# Patient Record
Sex: MALE | Race: WHITE | NOT HISPANIC OR LATINO | Employment: PART TIME | URBAN - METROPOLITAN AREA
[De-identification: names, ages, dates, MRNs, and addresses within clinical notes are randomized per-mention and may not be internally consistent; named-entity substitution may affect disease eponyms.]

---

## 2022-03-03 ENCOUNTER — APPOINTMENT (EMERGENCY)
Dept: RADIOLOGY | Facility: HOSPITAL | Age: 22
End: 2022-03-03
Payer: COMMERCIAL

## 2022-03-03 ENCOUNTER — HOSPITAL ENCOUNTER (EMERGENCY)
Facility: HOSPITAL | Age: 22
Discharge: HOME | End: 2022-03-03
Attending: EMERGENCY MEDICINE | Admitting: EMERGENCY MEDICINE
Payer: COMMERCIAL

## 2022-03-03 VITALS
SYSTOLIC BLOOD PRESSURE: 167 MMHG | RESPIRATION RATE: 18 BRPM | OXYGEN SATURATION: 99 % | TEMPERATURE: 98.6 F | DIASTOLIC BLOOD PRESSURE: 99 MMHG | HEART RATE: 67 BPM

## 2022-03-03 DIAGNOSIS — N50.819 TESTICULAR DISCOMFORT: Primary | ICD-10-CM

## 2022-03-03 LAB
BILIRUB UR QL STRIP: NEGATIVE
CLARITY UR: CLEAR
COLOR UR: YELLOW
GLUCOSE UR STRIP-MCNC: NEGATIVE MG/DL
HGB UR QL STRIP.AUTO: NEGATIVE
KETONES UR STRIP-MCNC: NEGATIVE MG/DL
LEUKOCYTE ESTERASE UR QL STRIP: NEGATIVE
NITRITE UR QL STRIP: NEGATIVE
PH UR STRIP.AUTO: 6 [PH] (ref 4.5–8)
PROT UR STRIP-MCNC: NEGATIVE MG/DL
SP GR UR STRIP.AUTO: >=1.03 (ref 1–1.03)
UROBILINOGEN UR QL STRIP.AUTO: 0.2 E.U./DL

## 2022-03-03 PROCEDURE — 87591 N.GONORRHOEAE DNA AMP PROB: CPT

## 2022-03-03 PROCEDURE — 87491 CHLMYD TRACH DNA AMP PROBE: CPT

## 2022-03-03 PROCEDURE — 99284 EMERGENCY DEPT VISIT MOD MDM: CPT

## 2022-03-03 PROCEDURE — 99284 EMERGENCY DEPT VISIT MOD MDM: CPT | Performed by: EMERGENCY MEDICINE

## 2022-03-03 PROCEDURE — 76870 US EXAM SCROTUM: CPT

## 2022-03-03 PROCEDURE — 81003 URINALYSIS AUTO W/O SCOPE: CPT

## 2022-03-03 NOTE — DISCHARGE INSTRUCTIONS
Please follow up with PCP and urologist as directed  Please continue to wear supportive underwear    Your urine was not infected today  GC/Chlamydia was sent out and should return in the next 1-2 days  Until then, please refrain from sexual activity  If resulted positive, needs to be treated and come back to the ED for this  If positive, please inform sexual partner (s)    If you develop worsening discomfort, testicular pain, swelling, discharge, or other testicular issue please return to the ED

## 2022-03-03 NOTE — ED PROVIDER NOTES
History  Chief Complaint   Patient presents with    Testicle Pain     pt states hx epididymitis in childhood which resolved within 1 week; pt states that his R testicle feels uncomfortable and "heavy"; denies pain currently     Patient is a 25year old male with hx of epididymitis, presenting to the ED for evaluation of R testicular swelling and "heaviness" for the past 1 week  Denies testicular pain  He states that heaviness is worse with standing and ambulation, improved with lying down  Patient does wear supportive underwear  He googled his symptoms and is concerned for recurrence of epididymitis or testicular cancer  He does report having a small mass in the R epididymitis from prior episode of inflammation  Patient has had 5 lifetime sexual partners, all women, all vaginal intercourse  Last sexual encounter was in January with a woman he has been dating for the last year  He has no concern for STDs at this time  He denies fevers, chills, abdominal pain, vomiting, dysuria, hematuria, frequency, urgency, penile lesions, penile discharge, pubic rash, pain with ejaculation or abnormal ejaculate color  None       No past medical history on file  No past surgical history on file  No family history on file  I have reviewed and agree with the history as documented  No existing history information found  No existing history information found  Social History     Tobacco Use    Smoking status: Not on file    Smokeless tobacco: Not on file   Substance Use Topics    Alcohol use: Not on file    Drug use: Not on file        Review of Systems   Constitutional: Negative for chills and fever  HENT: Negative for ear pain and sore throat  Eyes: Negative for pain and visual disturbance  Respiratory: Negative for cough, chest tightness and shortness of breath  Cardiovascular: Negative for chest pain and palpitations     Gastrointestinal: Negative for abdominal pain, diarrhea, nausea and vomiting  Genitourinary: Positive for scrotal swelling  Negative for decreased urine volume, difficulty urinating, dysuria, flank pain, frequency, genital sores, hematuria, penile discharge, penile pain, testicular pain and urgency  Musculoskeletal: Negative for arthralgias, back pain, gait problem, joint swelling and myalgias  Skin: Negative for color change and rash  Neurological: Negative for dizziness, seizures, syncope, weakness and headaches  All other systems reviewed and are negative  Physical Exam  ED Triage Vitals [03/03/22 1520]   Temperature Pulse Respirations Blood Pressure SpO2   98 6 °F (37 °C) 67 18 167/99 99 %      Temp Source Heart Rate Source Patient Position - Orthostatic VS BP Location FiO2 (%)   Temporal Monitor Sitting Left arm --      Pain Score       --             Orthostatic Vital Signs  Vitals:    03/03/22 1520   BP: 167/99   Pulse: 67   Patient Position - Orthostatic VS: Sitting       Physical Exam  Vitals and nursing note reviewed  Constitutional:       General: He is not in acute distress  Appearance: Normal appearance  He is well-developed  He is not ill-appearing or toxic-appearing  HENT:      Head: Normocephalic and atraumatic  Right Ear: External ear normal       Left Ear: External ear normal       Nose: Nose normal  No congestion  Mouth/Throat:      Mouth: Mucous membranes are moist       Pharynx: Oropharynx is clear  No posterior oropharyngeal erythema  Eyes:      General: No scleral icterus  Extraocular Movements: Extraocular movements intact  Conjunctiva/sclera: Conjunctivae normal       Pupils: Pupils are equal, round, and reactive to light  Cardiovascular:      Rate and Rhythm: Normal rate and regular rhythm  Pulses: Normal pulses  Heart sounds: Normal heart sounds  No murmur heard  Pulmonary:      Effort: Pulmonary effort is normal  No respiratory distress  Breath sounds: Normal breath sounds   No wheezing, rhonchi or rales  Abdominal:      General: Abdomen is flat  Bowel sounds are normal  There is no distension  Palpations: Abdomen is soft  Tenderness: There is no abdominal tenderness  There is no guarding  Hernia: There is no hernia in the left inguinal area or right inguinal area  Genitourinary:     Pubic Area: No rash  Penis: Normal and circumcised  No erythema, tenderness, discharge, swelling or lesions  Testes: Cremasteric reflex is present  Right: Mass (small palpable mass at the top of testicle at the border of epididymitis), tenderness, swelling, testicular hydrocele or varicocele not present  Cremasteric reflex is present  Left: Mass, tenderness, swelling, testicular hydrocele or varicocele not present  Cremasteric reflex is present  Epididymis:      Right: Not inflamed or enlarged  No mass or tenderness  Left: Normal       Moises stage (genital): 5  Musculoskeletal:         General: No signs of injury  Normal range of motion  Cervical back: Normal range of motion and neck supple  No tenderness  Right lower leg: No edema  Left lower leg: No edema  Lymphadenopathy:      Lower Body: No right inguinal adenopathy  No left inguinal adenopathy  Skin:     General: Skin is warm and dry  Capillary Refill: Capillary refill takes less than 2 seconds  Neurological:      Mental Status: He is alert and oriented to person, place, and time  ED Medications  Medications - No data to display    Diagnostic Studies  Results Reviewed     Procedure Component Value Units Date/Time    Chlamydia/GC amplified DNA by PCR [[id number]] Collected: 03/03/22 1750    Lab Status:  In process Specimen: Urine, Other Updated: 03/03/22 1757    Urine Macroscopic, POC [[id number]] Collected: 03/03/22 1753    Lab Status: Final result Specimen: Urine Updated: 03/03/22 1754     Color, UA Yellow     Clarity, UA Clear     pH, UA 6 0     Leukocytes, UA Negative Nitrite, UA Negative     Protein, UA Negative mg/dl      Glucose, UA Negative mg/dl      Ketones, UA Negative mg/dl      Urobilinogen, UA 0 2 E U /dl      Bilirubin, UA Negative     Blood, UA Negative     Specific Gravity, UA >=1 030    Narrative:      CLINITEK RESULT                 US scrotum and testicles   Final Result by Jonnathan Bonner MD (03/03 1811)       Normal sonographic appearance of the testes  No findings to explain right scrotal heaviness and swelling  Workstation performed: [id number]               Procedures  Procedures      ED Course     Patient requests Ultrasound of testis as he is concerned about testicular cancer  US ordered,as well as UA to check for infection and GC/Chlamydia  US findings reviewed with patient  He remains without testicular pain  Discussed that he should continue to wear supportive briefs and follow up with urology  Referral provided  Encouraged to return if he develops testicular pain or obvious testicular swelling  Advised to refrain from sexual activity until GC/Chlamydia resulted and if it is positive, he needs to be treated  Patient understands the importance of this  Agreeable with plan for discharge  Repeat /75      MDM    Disposition  Final diagnoses:   Testicular discomfort     Time reflects when diagnosis was documented in both MDM as applicable and the Disposition within this note     Time User Action Codes Description Comment    3/3/2022  6:39 PM Derian Medrano Add [N50 [id number]] Testicular discomfort       ED Disposition     ED Disposition Condition Date/Time Comment    Discharge Stable u Mar 3, 2022  6:38 PM Derian Medrano discharge to home/self care              Follow-up Information     Follow up With Specialties Details Why Contact Info [facility] Schedule an appointment as soon as possible for a visit   [address] [facility] [address] [facility] Schedule an appointment as soon as possible for a visit   [address] [facility] [address] [facility] [address]  [phone] [facility] [facility] [address], [place], [place], [address]          Patient's Medications    No medications on file     No discharge procedures on file  PDMP Review     None           ED Provider  Attending physically available and evaluated Neva Mercado I managed the patient along with the ED Attending      Electronically Signed by         Jt Viveros DO  03/03/22 2818

## 2022-03-03 NOTE — ED ATTENDING ATTESTATION
3/3/2022  IRadha MD, saw and evaluated the patient  I have discussed the patient with the resident/non-physician practitioner and agree with the resident's/non-physician practitioner's findings, Plan of Care, and MDM as documented in the resident's/non-physician practitioner's note, except where noted  All available labs and Radiology studies were reviewed  I was present for key portions of any procedure(s) performed by the resident/non-physician practitioner and I was immediately available to provide assistance  At this point I agree with the current assessment done in the Emergency Department  I have conducted an independent evaluation of this patient a history and physical is as follows:    OA: 24 y/o m with R testicular heaviness x 1 week  Pt does have a history of epididymitis as a child but notes the current sxms are different  He denies any overt pain, erythema or testicular swelling  Does not the top of his testicular felt different to him though  Denies any trauma  Denies any rash/lesions/dc/bleeding  No urinary sxms  No abd pain  No n/v  No fevers/chills  No history of STI  One current partner  On exam patient in no acute distress with stable vital signs  HEENT is normocephalic and atraumatic with moist mucous membranes and clear sclera conjunctiva  Neck is supple full range of motion  Heart is regular rate  Lungs are clear  Abdomen is soft positive bowel sounds no rebound or guarding no palpable hernias  Testicular exam via resident chart please see notation  No lower extremity edema or calf tenderness palpation  Intact distal pulses and capillary refill less than 2 seconds  Awake alert oriented appropriate  Assessment and plan testicular heaviness eval with urinalysis as well as urine GC chlamydia    Although the patient's TWIST   score is 0 as patient has no testicular swelling, testicles are not hard he has normal cremasteric reflex testicles are of normal lie and he has no nausea or vomiting, will obtain imaging with ultrasound  ED Course     Pt continues to deny any pain, swelling, n/v  Feels well  Comfortable with dc  Reviewed specific return precautions including swelling, pain, n/v or other concerns  Pt expresses understanding       Critical Care Time  Procedures

## 2022-03-04 LAB
C TRACH DNA SPEC QL NAA+PROBE: NEGATIVE
N GONORRHOEA DNA SPEC QL NAA+PROBE: NEGATIVE

## 2023-10-29 ENCOUNTER — APPOINTMENT (EMERGENCY)
Dept: RADIOLOGY | Facility: HOSPITAL | Age: 23
End: 2023-10-29
Payer: COMMERCIAL

## 2023-10-29 ENCOUNTER — HOSPITAL ENCOUNTER (EMERGENCY)
Facility: HOSPITAL | Age: 23
Discharge: HOME | End: 2023-10-29
Attending: EMERGENCY MEDICINE
Payer: COMMERCIAL

## 2023-10-29 VITALS
WEIGHT: 170 LBS | OXYGEN SATURATION: 100 % | RESPIRATION RATE: 18 BRPM | SYSTOLIC BLOOD PRESSURE: 156 MMHG | TEMPERATURE: 97.6 F | DIASTOLIC BLOOD PRESSURE: 79 MMHG | HEART RATE: 62 BPM

## 2023-10-29 DIAGNOSIS — S93.401A RIGHT ANKLE SPRAIN: Primary | ICD-10-CM

## 2023-10-29 PROCEDURE — 73630 X-RAY EXAM OF FOOT: CPT

## 2023-10-29 PROCEDURE — 99283 EMERGENCY DEPT VISIT LOW MDM: CPT

## 2023-10-29 PROCEDURE — 73610 X-RAY EXAM OF ANKLE: CPT

## 2023-10-29 PROCEDURE — 73650 X-RAY EXAM OF HEEL: CPT

## 2023-10-29 PROCEDURE — 99284 EMERGENCY DEPT VISIT MOD MDM: CPT | Performed by: EMERGENCY MEDICINE

## 2023-10-29 NOTE — ED PROVIDER NOTES
History  Chief Complaint   Patient presents with    Ankle Pain     Pt reports playing in soccer tournament, when another player stepped with cleated foot onto pt right ankle. Pt reports being unable to bend ankle and unable  to bear weight to extremity. HPI  21-year-old otherwise healthy male presents to the ED for evaluation of right ankle pain. He was playing soccer yesterday and somebody stepped on his foot and he hyper everted his foot. He was able to finish the game, but woke up this morning with severe pain and had difficulty bearing weight. He also notes some bruising but denies any wounds or additional color change. He denies any numbness, weakness. None       No past medical history on file. No past surgical history on file. No family history on file. I have reviewed and agree with the history as documented. E-Cigarette/Vaping     E-Cigarette/Vaping Substances     Social History     Tobacco Use    Smoking status: Never    Smokeless tobacco: Never   Substance Use Topics    Alcohol use: Not Currently    Drug use: Not Currently        Review of Systems   Musculoskeletal:  Positive for gait problem and joint swelling. Skin:  Negative for wound. Bruising   Neurological:  Negative for dizziness, weakness, light-headedness, numbness and headaches. Physical Exam  ED Triage Vitals [10/29/23 1409]   Temperature Pulse Respirations Blood Pressure SpO2   97.6 °F (36.4 °C) 62 18 156/79 100 %      Temp Source Heart Rate Source Patient Position - Orthostatic VS BP Location FiO2 (%)   Temporal Monitor Sitting Right arm --      Pain Score       6             Orthostatic Vital Signs  Vitals:    10/29/23 1409   BP: 156/79   Pulse: 62   Patient Position - Orthostatic VS: Sitting       Physical Exam  Constitutional:       General: He is not in acute distress. Appearance: Normal appearance. He is not ill-appearing. Cardiovascular:      Pulses: Normal pulses.    Musculoskeletal: Comments: Normal range of motion of knee, ankle, toes. No bony tenderness of fibular head, tibia or fibula, medial malleolus, lateral malleolus, calcaneus. Mild bony tenderness of base of fifth metatarsal.  Patient has soft tissue tenderness overlying insertion of Achilles tendon. Nj test is negative. Skin:     General: Skin is warm and dry. Findings: Bruising (Dorsum of right foot and posterior foot) present. Neurological:      Mental Status: He is alert. Sensory: No sensory deficit. Motor: No weakness. ED Medications  Medications - No data to display    Diagnostic Studies  Results Reviewed       None                   XR ankle 3+ vw right   ED Interpretation by Katrin Box DO (10/29 1538)   No fracture or dislocation. XR heel / calcaneus 2+ views RIGHT   ED Interpretation by Katrin Box DO (10/29 1538)   No fracture or dislocation. XR foot 3+ views RIGHT   ED Interpretation by Katrin Box DO (10/29 1538)   No fracture or dislocation. Procedures  Procedures      ED Course  ED Course as of 10/29/23 1544   Sun Oct 29, 2023   1538 XR heel / calcaneus 2+ views RIGHT   1538 XR foot 3+ views RIGHT   1538 XR ankle 3+ vw right  No acute findings on x-rays. [address] [facility] patient discussed reassuring work-up. I discussed precautions for ankle sprain and supportive treatment. Patient voiced understanding of the plan and all questions were answered. Strict return precautions given. Patient is hemodynamically stable and safe for discharge at this time. Medical Decision Making  19-year-old male with cycle injury. Initially was able to walk and finish game but woke up with increasing pain. Denies numbness, weakness, signs of decreased perfusion. Patient can bear weight but it is painful. Normal range of motion of knee, ankle, toes.   No bony tenderness of fibular head, tibia or fibula, medial malleolus, lateral malleolus, calcaneus. Mild bony tenderness of base of fifth metatarsal.  Patient has soft tissue tenderness overlying insertion of Achilles tendon. Nj test is negative. Mild bruising on the foot. Foot is neurologically intact. Concern for sprain, fracture, dislocation. I will get x-rays of ankle, calcaneus, foot. Patient does not require pain control at this time. Please see ED course for additional MDM. Amount and/or Complexity of Data Reviewed  Radiology: ordered and independent interpretation performed. Decision-making details documented in ED Course. Disposition  Final diagnoses:   Right ankle sprain     Time reflects when diagnosis was documented in both MDM as applicable and the Disposition within this note       Time User Action Codes Description Comment    10/29/2023  3:39 PM Concepcion Walls Add [C16.458F] Right ankle sprain           ED Disposition       ED Disposition   Discharge    Condition   Stable    Date/Time   Sun Oct 29, 2023  3:39 PM    [address] [facility] discharge to home/self care. Follow-up Information    None         Patient's Medications    No medications on file     No discharge procedures on file. PDMP Review       None             ED Provider  Attending physically available and evaluated Marcelo Bryant. I managed the patient along with the ED Attending.     Electronically Signed by           Concepcion Walls DO  10/29/23 0319

## 2023-10-29 NOTE — ED ATTENDING ATTESTATION
10/29/2023  I, Igor Denson MD, saw and evaluated the patient. I have discussed the patient with the resident/non-physician practitioner and agree with the resident's/non-physician practitioner's findings, Plan of Care, and MDM as documented in the resident's/non-physician practitioner's note, except where noted. All available labs and Radiology studies were reviewed. I was present for key portions of any procedure(s) performed by the resident/non-physician practitioner and I was immediately available to provide assistance. At this point I agree with the current assessment done in the Emergency Department. I have conducted an independent evaluation of this patient a history and physical is as follows:    ED Course         Critical Care Time  Procedures    27 yo male with ankle injury yesterday while playing soccer. Pt able to bear weight, but woke up this morning with pain and swelling of right ankle. Pt with no numbness, weakness. Pt noted some bruising. No previous injury to right ankle. Vss, afebrile, lungs cta, rrr, right ankle with eccymosis posteriorly, full rom, nvi, no achilles injury noted. Xrays, pain meds. Detail Level: Detailed Patient Specific Counseling (Will Not Stick From Patient To Patient): Prednisone

## 2023-10-29 NOTE — DISCHARGE INSTRUCTIONS
You likely have a sprained ankle. You can bear weight on the ankle as tolerated. You should avoid activity that you could sprain it again for approximately 2 weeks. In the meantime, for symptom relief, you can use ice, NSAIDs such as ibuprofen or Aleve, and elevate the foot at the end of the day. Please read the attached information about ankle sprains. You should return to the emergency room if you have sudden increase in your pain, if you cannot walk, or if your foot changes color.